# Patient Record
Sex: FEMALE | Race: WHITE | NOT HISPANIC OR LATINO | URBAN - NONMETROPOLITAN AREA
[De-identification: names, ages, dates, MRNs, and addresses within clinical notes are randomized per-mention and may not be internally consistent; named-entity substitution may affect disease eponyms.]

---

## 2019-10-04 ENCOUNTER — IMPORTED ENCOUNTER (OUTPATIENT)
Dept: URBAN - NONMETROPOLITAN AREA CLINIC 1 | Facility: CLINIC | Age: 69
End: 2019-10-04

## 2019-10-04 PROBLEM — H25.813: Noted: 2019-10-04

## 2019-10-04 PROCEDURE — 92004 COMPRE OPH EXAM NEW PT 1/>: CPT

## 2019-10-04 NOTE — PATIENT DISCUSSION
Cataract OU-Visually significant cataract OU.-Cataract(s) causing symptomatic impairment of visual function not correctable with a tolerable change in glasses or contact lenses lighting or non-operative means resulting in specific activity limitations and/or participation restrictions including but not limited to reading viewing television driving or meeting vocational or recreational needs. -Expectation is clearer vision and functional improvement in symptoms as well as reduced glare disability after cataract removal.-Order IOLMaster and OPD today. -Recommend Standard/Traditional OU based on today's OPD testing and lifestyle questionnaire.-All questions were answered regarding surgery including pre and post-op medications appointments activity restrictions and anesthetic usage.-The risks benefits and alternatives and special risk factors for the patient were discussed in detail including but not limited to: bleeding infection retinal detachment vitreous loss problems with the implant and possible need for additional surgery.-Although rare the possibility of complete vision loss was discussed.-The possible need for glasses post-operatively was discussed.-Order medical clearance exam based on history of hypertension and headaches.-Patient elects to proceed with cataract surgery OD. Will schedule at patient's convenience and re-evaluate OS in the future. ***Patient elects Standard/Traditional OU starting with OD

## 2019-10-17 ENCOUNTER — IMPORTED ENCOUNTER (OUTPATIENT)
Dept: URBAN - NONMETROPOLITAN AREA CLINIC 1 | Facility: CLINIC | Age: 69
End: 2019-10-17

## 2019-10-17 PROBLEM — H25.813: Noted: 2019-10-17

## 2019-10-31 ENCOUNTER — IMPORTED ENCOUNTER (OUTPATIENT)
Dept: URBAN - NONMETROPOLITAN AREA CLINIC 1 | Facility: CLINIC | Age: 69
End: 2019-10-31

## 2019-10-31 PROBLEM — H25.813: Noted: 2019-10-31

## 2019-11-06 ENCOUNTER — IMPORTED ENCOUNTER (OUTPATIENT)
Dept: URBAN - NONMETROPOLITAN AREA CLINIC 1 | Facility: CLINIC | Age: 69
End: 2019-11-06

## 2019-11-06 PROBLEM — E07.9: Noted: 2019-11-06

## 2019-11-06 PROBLEM — I10: Noted: 2019-11-06

## 2019-11-06 PROBLEM — Z01.818: Noted: 2019-11-06

## 2019-11-22 ENCOUNTER — IMPORTED ENCOUNTER (OUTPATIENT)
Dept: URBAN - NONMETROPOLITAN AREA CLINIC 1 | Facility: CLINIC | Age: 69
End: 2019-11-22

## 2019-11-22 PROBLEM — Z98.41: Noted: 2019-11-22

## 2019-11-22 PROCEDURE — 66984 XCAPSL CTRC RMVL W/O ECP: CPT

## 2019-11-22 PROCEDURE — V2787 ASTIGMATISM-CORRECT FUNCTION: HCPCS

## 2019-11-22 PROCEDURE — 92136 OPHTHALMIC BIOMETRY: CPT

## 2019-11-22 PROCEDURE — 99024 POSTOP FOLLOW-UP VISIT: CPT

## 2019-11-22 NOTE — PATIENT DISCUSSION
Same day s/p PCIOL OD-Pt doing well s/p PCIOL. -Continue post-op gtts according to instruction sheet and sleep with eye shield over eye for 7 nights.-Avoid bending at the waist lifting anything over 5lbs and dirty or adeel environments.

## 2019-11-26 ENCOUNTER — IMPORTED ENCOUNTER (OUTPATIENT)
Dept: URBAN - NONMETROPOLITAN AREA CLINIC 1 | Facility: CLINIC | Age: 69
End: 2019-11-26

## 2019-11-26 PROBLEM — Z98.41: Noted: 2019-11-26

## 2019-11-26 PROBLEM — H25.812: Noted: 2019-11-26

## 2019-11-26 PROCEDURE — 99024 POSTOP FOLLOW-UP VISIT: CPT

## 2019-11-26 PROCEDURE — 92012 INTRM OPH EXAM EST PATIENT: CPT

## 2019-11-26 NOTE — PATIENT DISCUSSION
Cataract OS-Visually significant cataract OS. -Cataract causing symptomatic impairment of visual function not correctable with a tolerable change in glasses or contact lenses lighting or non-operative means resulting in specific activity limitations and/or participation restrictions including but not limited to reading viewing television driving or meeting vocational or recreational needs. -Expectation is clearer vision and functional improvement in symptoms as well as reduced glare disability after cataract removal.-Recommend LRI/traditional based on previous OPD testing and lifestyle questionnaire.-All questions were answered regarding surgery including pre and post-op medications appointments activity restrictions and anesthetic usage.-The risks benefits and alternatives and special risk factors for the patient were discussed in detail including but not limited to: bleeding infection retinal detachment vitreous loss problems with the implant and possible need for additional surgery.-Although rare the possibility of complete vision loss was discussed.-The need for glasses post-operatively was discussed.-Patient elects to proceed with cataract surgery OS. Will schedule at patient's convenience. 4 day s/p PCIOL OD-Pt doing well at 4 day s/p PCIOL OD.-Continue post-op gtts according to instruction sheet.-Okay to resume usual activities and d/c eye shield.

## 2020-06-17 ENCOUNTER — PREPPED CHART (OUTPATIENT)
Dept: RURAL CLINIC 3 | Facility: CLINIC | Age: 70
End: 2020-06-17

## 2020-06-17 ENCOUNTER — IMPORTED ENCOUNTER (OUTPATIENT)
Dept: URBAN - NONMETROPOLITAN AREA CLINIC 1 | Facility: CLINIC | Age: 70
End: 2020-06-17

## 2020-06-17 PROBLEM — H52.4: Noted: 2020-06-17

## 2020-06-17 PROCEDURE — 92015 DETERMINE REFRACTIVE STATE: CPT

## 2020-06-17 PROCEDURE — S0621 ROUTINE OPHTHALMOLOGICAL EXA: HCPCS

## 2020-06-17 NOTE — PATIENT DISCUSSION
Discussed refractive status in detail with patient. New glasses Rx given today. Continue to monitor.

## 2022-04-04 ASSESSMENT — TONOMETRY
OS_IOP_MMHG: 12
OD_IOP_MMHG: 12

## 2022-04-04 ASSESSMENT — VISUAL ACUITY
OD_SC: 20/20
OS_SC: 20/20

## 2022-04-06 ENCOUNTER — ESTABLISHED PATIENT (OUTPATIENT)
Dept: RURAL CLINIC 3 | Facility: CLINIC | Age: 72
End: 2022-04-06

## 2022-04-06 DIAGNOSIS — H52.4: ICD-10-CM

## 2022-04-06 PROCEDURE — 92015 DETERMINE REFRACTIVE STATE: CPT

## 2022-04-06 PROCEDURE — 92014 COMPRE OPH EXAM EST PT 1/>: CPT

## 2022-04-06 ASSESSMENT — VISUAL ACUITY
OS_CC: 20/15-2
OD_CC: 20/15-2

## 2022-04-06 ASSESSMENT — TONOMETRY
OD_IOP_MMHG: 12
OS_IOP_MMHG: 12

## 2022-04-09 ASSESSMENT — VISUAL ACUITY
OD_PH: 20/40
OD_PH: 20/40
OS_CC: 20/70
OD_SC: 20/200
OS_GLARE: 20/400
OD_PH: 20/40-1
OD_PAM: 20/20
OS_GLARE: 20/400
OS_AM: 20/20
OS_CC: 20/70
OD_CC: 20/100
OS_CC: 20/20
OS_CC: 20/70
OS_SC: 20/70
OS_CC: 20/70
OS_SC: 20/70
OD_SC: 20/200
OS_AM: 20/20
OD_PAM: 20/20
OD_CC: 20/20
OS_PH: 20/50
OS_PH: 20/50
OD_CC: 20/90-1
OD_CC: 20/20
OD_CC: 20/100

## 2022-04-09 ASSESSMENT — TONOMETRY
OD_IOP_MMHG: 12
OS_IOP_MMHG: 12
OD_IOP_MMHG: 12
OS_IOP_MMHG: 19
OD_IOP_MMHG: 12
OD_IOP_MMHG: 19
OS_IOP_MMHG: 19
OS_IOP_MMHG: 12

## 2023-07-12 ENCOUNTER — ESTABLISHED PATIENT (OUTPATIENT)
Dept: RURAL CLINIC 3 | Facility: CLINIC | Age: 73
End: 2023-07-12

## 2023-07-12 DIAGNOSIS — H52.4: ICD-10-CM

## 2023-07-12 PROCEDURE — 92014 COMPRE OPH EXAM EST PT 1/>: CPT

## 2023-07-12 PROCEDURE — 92015 DETERMINE REFRACTIVE STATE: CPT

## 2023-07-12 ASSESSMENT — TONOMETRY
OD_IOP_MMHG: 14
OS_IOP_MMHG: 15

## 2023-07-12 ASSESSMENT — VISUAL ACUITY: OS_CC: 20/20

## 2024-09-17 ENCOUNTER — COMPREHENSIVE EXAM (OUTPATIENT)
Dept: RURAL CLINIC 3 | Facility: CLINIC | Age: 74
End: 2024-09-17

## 2024-09-17 DIAGNOSIS — H52.4: ICD-10-CM

## 2024-09-17 DIAGNOSIS — Z96.1: ICD-10-CM

## 2024-09-17 PROCEDURE — 92014 COMPRE OPH EXAM EST PT 1/>: CPT

## 2024-09-17 PROCEDURE — 92015 DETERMINE REFRACTIVE STATE: CPT
